# Patient Record
Sex: MALE | Race: WHITE | NOT HISPANIC OR LATINO
[De-identification: names, ages, dates, MRNs, and addresses within clinical notes are randomized per-mention and may not be internally consistent; named-entity substitution may affect disease eponyms.]

---

## 2018-04-26 ENCOUNTER — APPOINTMENT (OUTPATIENT)
Dept: FAMILY MEDICINE | Facility: CLINIC | Age: 83
End: 2018-04-26
Payer: MEDICARE

## 2018-04-26 ENCOUNTER — LABORATORY RESULT (OUTPATIENT)
Age: 83
End: 2018-04-26

## 2018-04-26 VITALS
TEMPERATURE: 98.7 F | SYSTOLIC BLOOD PRESSURE: 130 MMHG | OXYGEN SATURATION: 98 % | WEIGHT: 160 LBS | DIASTOLIC BLOOD PRESSURE: 64 MMHG | RESPIRATION RATE: 16 BRPM | HEART RATE: 76 BPM | HEIGHT: 66 IN | BODY MASS INDEX: 25.71 KG/M2

## 2018-04-26 DIAGNOSIS — M70.22 OLECRANON BURSITIS, LEFT ELBOW: ICD-10-CM

## 2018-04-26 DIAGNOSIS — H91.93 UNSPECIFIED HEARING LOSS, BILATERAL: ICD-10-CM

## 2018-04-26 DIAGNOSIS — T16.9XXA FOREIGN BODY IN EAR, UNSPECIFIED EAR, INITIAL ENCOUNTER: ICD-10-CM

## 2018-04-26 DIAGNOSIS — Z86.69 PERSONAL HISTORY OF OTHER DISEASES OF THE NERVOUS SYSTEM AND SENSE ORGANS: ICD-10-CM

## 2018-04-26 DIAGNOSIS — H60.90 UNSPECIFIED OTITIS EXTERNA, UNSPECIFIED EAR: ICD-10-CM

## 2018-04-26 PROBLEM — Z00.00 ENCOUNTER FOR PREVENTIVE HEALTH EXAMINATION: Status: ACTIVE | Noted: 2018-04-26

## 2018-04-26 PROCEDURE — 20605 DRAIN/INJ JOINT/BURSA W/O US: CPT | Mod: 59

## 2018-04-26 PROCEDURE — 99214 OFFICE O/P EST MOD 30 MIN: CPT | Mod: 25

## 2019-01-22 ENCOUNTER — APPOINTMENT (OUTPATIENT)
Dept: FAMILY MEDICINE | Facility: CLINIC | Age: 84
End: 2019-01-22
Payer: MEDICARE

## 2019-01-22 VITALS
OXYGEN SATURATION: 98 % | BODY MASS INDEX: 26.52 KG/M2 | SYSTOLIC BLOOD PRESSURE: 140 MMHG | HEART RATE: 74 BPM | RESPIRATION RATE: 16 BRPM | TEMPERATURE: 98.1 F | WEIGHT: 165 LBS | HEIGHT: 66 IN | DIASTOLIC BLOOD PRESSURE: 82 MMHG
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
## 2019-01-22 PROCEDURE — 99213 OFFICE O/P EST LOW 20 MIN: CPT

## 2019-01-22 RX ORDER — FAMCICLOVIR 500 MG/1
500 TABLET, FILM COATED ORAL 3 TIMES DAILY
Qty: 42 | Refills: 0 | Status: DISCONTINUED | COMMUNITY
Start: 2019-01-22 | End: 2019-01-22

## 2019-01-22 NOTE — HISTORY OF PRESENT ILLNESS
[FreeTextEntry1] : pt stated he's having pain in his groin, believed to be a hernia flair up. \par Pt stated he's using a brace from the previous time and now has a painful rash  in the area

## 2019-01-22 NOTE — PHYSICAL EXAM

## 2019-01-22 NOTE — HEALTH RISK ASSESSMENT
[No falls in past year] : Patient reported no falls in the past year [0] : 2) Feeling down, depressed, or hopeless: Not at all (0) [[id number]] : 0

## 2019-01-22 NOTE — REVIEW OF SYSTEMS
[Skin Rash] : skin rash [Negative] : Heme/Lymph [de-identified] : burning, pain from the right groin to the right back

## 2019-01-23 RX ORDER — ACYCLOVIR 50 MG/G
5 CREAM TOPICAL
Qty: 1 | Refills: 2 | Status: DISCONTINUED | COMMUNITY
Start: 2019-01-22 | End: 2019-01-23

## 2019-01-31 ENCOUNTER — APPOINTMENT (OUTPATIENT)
Dept: FAMILY MEDICINE | Facility: CLINIC | Age: 84
End: 2019-01-31
Payer: MEDICARE

## 2019-01-31 VITALS
HEART RATE: 86 BPM | OXYGEN SATURATION: 98 % | HEIGHT: 66 IN | WEIGHT: 165 LBS | DIASTOLIC BLOOD PRESSURE: 64 MMHG | BODY MASS INDEX: 26.52 KG/M2 | SYSTOLIC BLOOD PRESSURE: 118 MMHG | RESPIRATION RATE: 16 BRPM | TEMPERATURE: 98.3 F

## 2019-01-31 DIAGNOSIS — B02.9 ZOSTER W/OUT COMPLICATIONS: ICD-10-CM

## 2019-01-31 PROCEDURE — 99213 OFFICE O/P EST LOW 20 MIN: CPT

## 2019-01-31 NOTE — REVIEW OF SYSTEMS
[Abdominal Pain] : abdominal pain [Nausea] : nausea [Negative] : Heme/Lymph [FreeTextEntry7] : shingles pain

## 2019-01-31 NOTE — PLAN
[FreeTextEntry1] : Pt will start gabapentin 100mg po daily\par f/u in 1 week\par percocet prn for pain\par labs next visit\par c/w acyclovir bid\par

## 2019-01-31 NOTE — PHYSICAL EXAM

## 2019-01-31 NOTE — HISTORY OF PRESENT ILLNESS
[FreeTextEntry8] : pt following up on shingles\par was dx with shingles 1/22/19 last visit\par medication not helping with the pain

## 2019-02-05 ENCOUNTER — APPOINTMENT (OUTPATIENT)
Dept: FAMILY MEDICINE | Facility: CLINIC | Age: 84
End: 2019-02-05
Payer: MEDICARE

## 2019-02-05 VITALS
RESPIRATION RATE: 15 BRPM | OXYGEN SATURATION: 97 % | SYSTOLIC BLOOD PRESSURE: 122 MMHG | TEMPERATURE: 98.3 F | HEART RATE: 70 BPM | WEIGHT: 164.19 LBS | HEIGHT: 66 IN | DIASTOLIC BLOOD PRESSURE: 72 MMHG | BODY MASS INDEX: 26.39 KG/M2

## 2019-02-05 DIAGNOSIS — M79.2 NEURALGIA AND NEURITIS, UNSPECIFIED: ICD-10-CM

## 2019-02-05 DIAGNOSIS — B02.23 POSTHERPETIC POLYNEUROPATHY: ICD-10-CM

## 2019-02-05 DIAGNOSIS — B02.29 OTHER POSTHERPETIC NERVOUS SYSTEM INVOLVEMENT: ICD-10-CM

## 2019-02-05 PROCEDURE — 99213 OFFICE O/P EST LOW 20 MIN: CPT

## 2019-02-05 NOTE — PHYSICAL EXAM

## 2019-02-05 NOTE — HISTORY OF PRESENT ILLNESS
[FreeTextEntry1] : follow up from shingles\par pain is still there, shingles rash is clearing up (slightly better; still in pain)

## 2019-02-05 NOTE — REVIEW OF SYSTEMS
[Skin Rash] : skin rash [Negative] : Heme/Lymph [de-identified] : old rash still has pain wants to take Aleve

## 2019-02-05 NOTE — PLAN
[FreeTextEntry1] : Gabapentin increase to 200mg TID until pain is better increase to 300mg at night if needed\par Aleve 2 tabs prn\par f/u in 1 month\par I advised pt to f/u at [facility] for shingles vaccine once cleared\par

## 2019-11-22 PROCEDURE — 99285 EMERGENCY DEPT VISIT HI MDM: CPT

## 2019-11-22 PROCEDURE — 71045 X-RAY EXAM CHEST 1 VIEW: CPT | Mod: 26

## 2019-11-23 ENCOUNTER — INPATIENT (INPATIENT)
Facility: HOSPITAL | Age: 84
LOS: 1 days | Discharge: HOME | End: 2019-11-25
Attending: FAMILY MEDICINE | Admitting: FAMILY MEDICINE
Payer: MEDICARE

## 2019-11-23 ENCOUNTER — OUTPATIENT (OUTPATIENT)
Dept: OUTPATIENT SERVICES | Facility: HOSPITAL | Age: 84
LOS: 1 days | End: 2019-11-23

## 2019-11-23 PROCEDURE — 99215 OFFICE O/P EST HI 40 MIN: CPT

## 2019-11-23 PROCEDURE — 93010 ELECTROCARDIOGRAM REPORT: CPT | Mod: 76

## 2019-11-24 PROCEDURE — 93306 TTE W/DOPPLER COMPLETE: CPT | Mod: 26

## 2019-11-24 PROCEDURE — 93010 ELECTROCARDIOGRAM REPORT: CPT

## 2019-11-25 PROCEDURE — 93010 ELECTROCARDIOGRAM REPORT: CPT

## 2019-11-25 PROCEDURE — 99232 SBSQ HOSP IP/OBS MODERATE 35: CPT

## 2019-12-02 ENCOUNTER — APPOINTMENT (OUTPATIENT)
Dept: CARDIOLOGY | Facility: CLINIC | Age: 84
End: 2019-12-02
Payer: MEDICARE

## 2019-12-02 ENCOUNTER — NON-APPOINTMENT (OUTPATIENT)
Age: 84
End: 2019-12-02

## 2019-12-02 VITALS
BODY MASS INDEX: 24.11 KG/M2 | DIASTOLIC BLOOD PRESSURE: 62 MMHG | OXYGEN SATURATION: 98 % | HEIGHT: 66 IN | SYSTOLIC BLOOD PRESSURE: 112 MMHG | WEIGHT: 150 LBS | HEART RATE: 61 BPM

## 2019-12-02 DIAGNOSIS — I25.10 ATHEROSCLEROTIC HEART DISEASE OF NATIVE CORONARY ARTERY W/OUT ANGINA PECTORIS: ICD-10-CM

## 2019-12-02 PROCEDURE — 99214 OFFICE O/P EST MOD 30 MIN: CPT

## 2019-12-02 PROCEDURE — 93000 ELECTROCARDIOGRAM COMPLETE: CPT

## 2019-12-02 RX ORDER — ACYCLOVIR 400 MG/1
400 TABLET ORAL TWICE DAILY
Qty: 28 | Refills: 0 | Status: DISCONTINUED | COMMUNITY
Start: 2019-01-22 | End: 2019-12-02

## 2019-12-02 RX ORDER — HYDROCODONE BITARTRATE AND ACETAMINOPHEN 5; 300 MG/1; MG/1
5-300 TABLET ORAL
Qty: 21 | Refills: 0 | Status: DISCONTINUED | COMMUNITY
Start: 2019-01-22 | End: 2019-12-02

## 2019-12-02 RX ORDER — GABAPENTIN 100 MG/1
100 CAPSULE ORAL
Qty: 180 | Refills: 0 | Status: DISCONTINUED | COMMUNITY
Start: 2019-01-31 | End: 2019-12-02

## 2019-12-02 RX ORDER — ACYCLOVIR 50 MG/G
5 OINTMENT TOPICAL 3 TIMES DAILY
Qty: 1 | Refills: 2 | Status: DISCONTINUED | COMMUNITY
Start: 2019-01-23 | End: 2019-12-02

## 2019-12-02 RX ORDER — ASPIRIN 81 MG/1
81 TABLET, COATED ORAL DAILY
Refills: 0 | Status: ACTIVE | COMMUNITY

## 2019-12-02 RX ORDER — CARBAMAZEPINE 200 MG/1
200 TABLET ORAL 3 TIMES DAILY
Refills: 0 | Status: ACTIVE | COMMUNITY

## 2020-03-06 ENCOUNTER — APPOINTMENT (OUTPATIENT)
Dept: CARDIOLOGY | Facility: CLINIC | Age: 85
End: 2020-03-06
Payer: MEDICARE

## 2020-03-06 VITALS
WEIGHT: 150 LBS | SYSTOLIC BLOOD PRESSURE: 164 MMHG | OXYGEN SATURATION: 97 % | DIASTOLIC BLOOD PRESSURE: 74 MMHG | BODY MASS INDEX: 24.11 KG/M2 | HEART RATE: 62 BPM | HEIGHT: 66 IN

## 2020-03-06 PROCEDURE — 99214 OFFICE O/P EST MOD 30 MIN: CPT

## 2020-03-06 RX ORDER — ATORVASTATIN CALCIUM 40 MG/1
40 TABLET, FILM COATED ORAL DAILY
Refills: 0 | Status: ACTIVE | COMMUNITY

## 2020-07-29 RX ORDER — METOPROLOL SUCCINATE 50 MG/1
50 TABLET, EXTENDED RELEASE ORAL DAILY
Qty: 90 | Refills: 3 | Status: ACTIVE | COMMUNITY

## 2020-09-11 ENCOUNTER — NON-APPOINTMENT (OUTPATIENT)
Age: 85
End: 2020-09-11

## 2020-09-11 ENCOUNTER — APPOINTMENT (OUTPATIENT)
Dept: CARDIOLOGY | Facility: CLINIC | Age: 85
End: 2020-09-11
Payer: MEDICARE

## 2020-09-11 VITALS
HEART RATE: 50 BPM | HEIGHT: 66 IN | TEMPERATURE: 97.8 F | DIASTOLIC BLOOD PRESSURE: 74 MMHG | WEIGHT: 166 LBS | OXYGEN SATURATION: 98 % | BODY MASS INDEX: 26.68 KG/M2 | SYSTOLIC BLOOD PRESSURE: 172 MMHG

## 2020-09-11 PROCEDURE — 93000 ELECTROCARDIOGRAM COMPLETE: CPT

## 2020-09-11 PROCEDURE — 99214 OFFICE O/P EST MOD 30 MIN: CPT

## 2020-09-11 RX ORDER — COLCHICINE 0.6 MG/1
0.6 TABLET ORAL TWICE DAILY
Qty: 180 | Refills: 1 | Status: DISCONTINUED | COMMUNITY
Start: 1900-01-01 | End: 2020-09-11

## 2021-04-26 ENCOUNTER — APPOINTMENT (OUTPATIENT)
Dept: CARDIOLOGY | Facility: CLINIC | Age: 86
End: 2021-04-26